# Patient Record
Sex: FEMALE | Race: WHITE | Employment: FULL TIME | URBAN - METROPOLITAN AREA
[De-identification: names, ages, dates, MRNs, and addresses within clinical notes are randomized per-mention and may not be internally consistent; named-entity substitution may affect disease eponyms.]

---

## 2024-05-13 NOTE — PROGRESS NOTES
[facility] NEUROLOGY  [address], [address]  [place] [address]  Phone: [phone] Fax [phone]  Jackson Null MD      Patient: Eva Trejo  Provider: Jackson Null MD    CC:   Chief Complaint   Patient presents with    New Patient     Parkinson's disease     Referring Provider:    History of Present Illness:     Eva Trejo is a 44 y.o. RH female who presents for evaluation of Parkinson's disease.     She is accompanied by her spouse.      Patient presents for further evaluation of a previous diagnosis of Parkinson's disease.  Symptom onset in approximately early 2022.  I have reviewed prior notes from other neurology groups.      Current medications include (but not limited to):  Ropinirole 0.5 mg at night  Rasagiline 0.5 mg daily    Previous medication trials include: N/A    Patient presents for further evaluation of probable Parkinson's disease.      Chart review indicates she was previously seen with [facility] in March 2023 with concern for tremors of the left upper extremity.  These were described as more of an intermittent jerking and myoclonic type movements, worsened by stress and other activities such as lifting.  Concern was raised for the possibility of a left ulnar nerve entrapment.  But I am not able to see other nerve conduction studies for review.    She had an MRI of the brain in October 2022 that was unremarkable.  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  She ultimately underwent a C3-C7 ACDF in July 2023.  She later had a DaTscan that was abnormal after worsening parkinsonism was suspected.    Unfortunately postoperatively she has only experienced worsening neck pain and stiffness.  She has very limited range of motion at the neck with head turning.  She has been working with physical therapy with only mild benefit.  She denies any tremor of the

## 2024-05-14 ENCOUNTER — OFFICE VISIT (OUTPATIENT)
Dept: NEUROLOGY | Age: 44
End: 2024-05-14
Payer: COMMERCIAL

## 2024-05-14 VITALS
HEIGHT: 68 IN | WEIGHT: 126 LBS | HEART RATE: 74 BPM | DIASTOLIC BLOOD PRESSURE: 91 MMHG | BODY MASS INDEX: 19.1 KG/M2 | SYSTOLIC BLOOD PRESSURE: 164 MMHG

## 2024-05-14 DIAGNOSIS — G89.29 CHRONIC NECK PAIN WITH ABNORMAL NEUROLOGIC EXAMINATION: ICD-10-CM

## 2024-05-14 DIAGNOSIS — G20.A1 PARKINSON'S DISEASE WITHOUT DYSKINESIA OR FLUCTUATING MANIFESTATIONS (HCC): Primary | ICD-10-CM

## 2024-05-14 DIAGNOSIS — G25.81 RLS (RESTLESS LEGS SYNDROME): ICD-10-CM

## 2024-05-14 DIAGNOSIS — G24.3 CERVICAL DYSTONIA: ICD-10-CM

## 2024-05-14 DIAGNOSIS — R26.9 GAIT DISTURBANCE: ICD-10-CM

## 2024-05-14 DIAGNOSIS — M54.2 CHRONIC NECK PAIN WITH ABNORMAL NEUROLOGIC EXAMINATION: ICD-10-CM

## 2024-05-14 PROCEDURE — 99205 OFFICE O/P NEW HI 60 MIN: CPT | Performed by: PSYCHIATRY & NEUROLOGY

## 2024-05-14 RX ORDER — BACLOFEN 10 MG/1
10 TABLET ORAL
COMMUNITY

## 2024-05-14 RX ORDER — TAPENTADOL HYDROCHLORIDE 50 MG/1
50 TABLET, FILM COATED ORAL 2 TIMES DAILY
COMMUNITY

## 2024-05-14 RX ORDER — ROPINIROLE 0.5 MG/1
0.5 TABLET, FILM COATED ORAL
COMMUNITY

## 2024-05-14 RX ORDER — RASAGILINE 0.5 MG/1
0.5 TABLET ORAL DAILY
COMMUNITY

## 2024-05-14 RX ORDER — ACETAMINOPHEN 160 MG
2000 TABLET,DISINTEGRATING ORAL DAILY
COMMUNITY

## 2024-05-14 RX ORDER — HYDROCHLOROTHIAZIDE 25 MG/1
25 TABLET ORAL DAILY
COMMUNITY

## 2024-05-14 ASSESSMENT — ENCOUNTER SYMPTOMS
VOICE CHANGE: 0
TROUBLE SWALLOWING: 1
COUGH: 0
CONSTIPATION: 1

## 2024-05-14 NOTE — PATIENT INSTRUCTIONS
Start carbidopa/levodopa  mg tablets.   Week 1: Take 1/2 tablet in the morning  Week 2: Take 1/2 tablet in the morning, 1/2 tablet in the afternoon  Week 3: Take 1 tablet in the morning, 1 tablet in the afternoon    Continue your other medications as prescribed.     Parkinson’s disease:    Parkinson’s disease is a progressive disease of the nervous system. In most patients, it is sporadic, which means it occurs in someone for no clear reason. Less than 10% of patients with Parkinson’s disease have a form that runs in the family. While there is currently no cure for Parkinson’s disease, there are now many different medications that can help with the symptoms of the disease. There is also a great deal of research on all aspects of Parkinson’s disease, including studying how to slow or stop its progression.  Parkinson’s disease usually starts by causing motor, or movement, symptoms. Some people will notice a tremor on one side of the body, usually in the hand when it is at rest, such as in one’s lap. Slowness and stiffness are also common symptoms in Parkinson’s disease, sometimes affecting a person’s ability to walk. Everyone with Parkinson’s disease is different, though, and one person may or may not have all the different symptoms.     People with Parkinson’s disease can also have non-motor, or non-movement, symptoms. For example, constipation is common. Some patients may also have urinary complaints. Anxiety and depression each occur at some point in over half of patients with Parkinson’s disease. Usually later in the disease course, difficulties with memory and thinking are common. Both movement and non-movement symptoms of Parkinson’s disease progress slowly over time. Many of these symptoms are treatable.     There are many different medications that can help the symptoms of Parkinson’s disease, and you can discuss with your doctor what options are best for you. Most of these medications target the

## 2024-05-31 ENCOUNTER — TELEPHONE (OUTPATIENT)
Dept: NEUROLOGY | Age: 44
End: 2024-05-31

## 2024-05-31 RX ORDER — CARBIDOPA 25 MG/1
TABLET ORAL
Qty: 90 TABLET | Refills: 1 | Status: SHIPPED | OUTPATIENT
Start: 2024-05-31

## 2024-05-31 NOTE — TELEPHONE ENCOUNTER
Patient taking Sinemet 25/100mg 1 tab BID. She gets extremely nauseous 2 hours after taking it. She has tried taking it with toast or a small meal but it doesn't help. Is there something she can take to counteract the nausea?

## 2024-06-03 NOTE — TELEPHONE ENCOUNTER
She can try the lodosyn as recommended by Dr. SHIELDS.     If it is not helpful or if she cannot get it from the pharmacy (sometimes out-of-pocket cost is high) then let us know and we can consider other alternatives.

## 2024-07-25 ENCOUNTER — OFFICE VISIT (OUTPATIENT)
Dept: NEUROLOGY | Age: 44
End: 2024-07-25
Payer: COMMERCIAL

## 2024-07-25 VITALS
DIASTOLIC BLOOD PRESSURE: 92 MMHG | HEART RATE: 73 BPM | SYSTOLIC BLOOD PRESSURE: 138 MMHG | OXYGEN SATURATION: 96 % | BODY MASS INDEX: 18.85 KG/M2 | WEIGHT: 124 LBS

## 2024-07-25 DIAGNOSIS — M54.2 NECK PAIN: ICD-10-CM

## 2024-07-25 DIAGNOSIS — R13.10 DYSPHAGIA, UNSPECIFIED TYPE: ICD-10-CM

## 2024-07-25 DIAGNOSIS — R20.0 NUMBNESS AND TINGLING: ICD-10-CM

## 2024-07-25 DIAGNOSIS — G20.A2 PARKINSON'S DISEASE WITHOUT DYSKINESIA, WITH FLUCTUATING MANIFESTATIONS (HCC): Primary | ICD-10-CM

## 2024-07-25 DIAGNOSIS — R49.8 HYPOPHONIA: ICD-10-CM

## 2024-07-25 DIAGNOSIS — R20.2 NUMBNESS AND TINGLING: ICD-10-CM

## 2024-07-25 DIAGNOSIS — R26.9 GAIT DISTURBANCE: ICD-10-CM

## 2024-07-25 DIAGNOSIS — M50.90 CERVICAL DISC DISORDER: ICD-10-CM

## 2024-07-25 PROBLEM — Z48.89 POSTOPERATIVE VISIT: Status: ACTIVE | Noted: 2023-11-13

## 2024-07-25 PROBLEM — G56.22 ENTRAPMENT OF LEFT ULNAR NERVE AT ELBOW: Status: ACTIVE | Noted: 2023-02-16

## 2024-07-25 PROBLEM — I10 ESSENTIAL HYPERTENSION: Status: ACTIVE | Noted: 2017-08-31

## 2024-07-25 PROBLEM — G20.A1 PARKINSON'S DISEASE (HCC): Status: ACTIVE | Noted: 2024-06-12

## 2024-07-25 PROBLEM — K21.9 GERD (GASTROESOPHAGEAL REFLUX DISEASE): Status: ACTIVE | Noted: 2024-07-25

## 2024-07-25 PROCEDURE — 99215 OFFICE O/P EST HI 40 MIN: CPT

## 2024-07-25 PROCEDURE — 3080F DIAST BP >= 90 MM HG: CPT

## 2024-07-25 PROCEDURE — 3075F SYST BP GE 130 - 139MM HG: CPT

## 2024-07-25 PROCEDURE — 99417 PROLNG OP E/M EACH 15 MIN: CPT

## 2024-07-25 RX ORDER — CARBIDOPA AND LEVODOPA 50; 200 MG/1; MG/1
1 TABLET, EXTENDED RELEASE ORAL NIGHTLY
Qty: 90 TABLET | Refills: 0 | Status: SHIPPED | OUTPATIENT
Start: 2024-07-25 | End: 2024-10-23

## 2024-07-25 NOTE — PROGRESS NOTES
Therapy      Today, the patient presents for ongoing follow-up and continued management of Parkinson's disease. This diagnosis is supported by an abnormal DaTscan.     Parkinson's disease:  -Continue rasagiline 0.5 mg daily  -Sinemet  mg 1 tablet 3 times a day  -Trial of Sinemet CR  mg 1 tablet at night    Cervical dystonia:  -Continue Botox injections as scheduled    Chronic neck pain:  -Continue to follow-up with pain management    Gait disturbance:  -Reviewed and discussed general safety precautions and fall prevention strategies    RLS:  -Continue ropinirole      -Counseled patient on the importance of consistency pertaining to the following: daily routine, sleep hygiene, adequate hydration, minimal caffeine intake, physical activity/aerobic exercise, healthy coping tools/stress management, and adherence to medication regimen.   -All questions were answered to the best of my ability.  -To follow up with Dr. Null as scheduled.         Return Schedule PT and ST evals with Stevie/Angelica. Coordinate visits..     Signature: ALEXIS Mcgowan     [facility]   [address] [place] [address], [place] [address]  Ph: [phone]  Fax: [phone]       I have personally interviewed and examined Eva Trejo is a 44 y.o. female and I have personally reviewed all relevant records including labs and imaging as noted above. I have written all aspects of this note. More than 50% of this time was used for counseling regarding my diagnosis, prognosis, and plans for management. Total visit time: 95 minutes.

## 2024-07-25 NOTE — PATIENT INSTRUCTIONS
Sinemet  mg 1 tablet three times daily (9 AM, 2 PM, 7 PM)  Sinemet CR  mg 1 tablet at night (1 hour before bed)

## 2024-08-27 ENCOUNTER — OFFICE VISIT (OUTPATIENT)
Age: 44
End: 2024-08-27
Payer: COMMERCIAL

## 2024-08-27 DIAGNOSIS — G20.A2 PARKINSON'S DISEASE WITHOUT DYSKINESIA, WITH FLUCTUATING MANIFESTATIONS (HCC): Primary | ICD-10-CM

## 2024-08-27 DIAGNOSIS — G20.A1 HYPOKINETIC PARKINSONIAN DYSPHONIA (HCC): ICD-10-CM

## 2024-08-27 DIAGNOSIS — R49.8 HYPOPHONIA: ICD-10-CM

## 2024-08-27 DIAGNOSIS — R29.3 ABNORMAL POSTURE: ICD-10-CM

## 2024-08-27 DIAGNOSIS — R26.81 UNSTEADINESS ON FEET: ICD-10-CM

## 2024-08-27 DIAGNOSIS — M54.2 NECK PAIN: ICD-10-CM

## 2024-08-27 DIAGNOSIS — M50.90 CERVICAL DISC DISORDER: ICD-10-CM

## 2024-08-27 DIAGNOSIS — R49.0 HYPOKINETIC PARKINSONIAN DYSPHONIA (HCC): ICD-10-CM

## 2024-08-27 DIAGNOSIS — R13.10 DYSPHAGIA, UNSPECIFIED TYPE: ICD-10-CM

## 2024-08-27 DIAGNOSIS — R26.9 GAIT ABNORMALITY: Primary | ICD-10-CM

## 2024-08-27 DIAGNOSIS — G20.A2 PARKINSON'S DISEASE WITHOUT DYSKINESIA, WITH FLUCTUATING MANIFESTATIONS (HCC): ICD-10-CM

## 2024-08-27 DIAGNOSIS — R26.9 GAIT DISTURBANCE: ICD-10-CM

## 2024-08-27 PROCEDURE — 97161 PT EVAL LOW COMPLEX 20 MIN: CPT

## 2024-08-27 PROCEDURE — 92524 BEHAVRAL QUALIT ANALYS VOICE: CPT | Performed by: SPEECH-LANGUAGE PATHOLOGIST

## 2024-08-27 NOTE — PROGRESS NOTES
Physical Therapy Initial Assessment     Referring MD: Sandra Roque, NP-C  Diagnosis:     ICD-10-CM    1. Gait abnormality  R26.9       2. Unsteadiness on feet  R26.81       3. Abnormal posture  R29.3       4. Neck pain  M54.2          Therapy precautions:None  Co-morbidities affecting plan of care: Parkinson's Disease, C3-C7 ACDF  Total Timed Procedure Codes: 0 min, Total Time: 45 min      CLINICAL DECISION MAKING/ASSESSMENT     Personal Factors/co-morbidities affecting POC (1-2 Medium/3+High): behavioral patterns  habits/routines  co-morbidities as previously noted   Problem List: (1-2 Low/ 3 Medium/ 4+ High) Pain  Soft tissue restrictions  Strength deficits  Muscle spasm  Motor control deficits  Impaired posture  Impaired gait  Impaired balance/proprioception    Clinical decision making: low complexity with therapist able to easily and confidently determine and predict expectations and future outcomes for the POC.  Prognosis: good   Benefits and precautions of treatment explained to patient.    Eva Trejo is a 44 y.o. female who presents to therapy today with a h/o Parkinson's disease and C3-C7 ACDF in 7/2023.  She was initially doing well following C-spine surgery but several weeks after surgery she began to lose ROM and started to experience increase pain in the neck.  She has been participating in PT with [facility] to address her neck issues with her most recent visit being 8/22/24.  She is being referred here for PT related to her Parkinson's Disease.  Her gait and balance deficits are very mild at this time however she is noticing some mild instability with performing her home exercises at time. A large portion of her visit today was spent discussing and assessing her neck deficits which are negatively impacting her mobility therefore not as much testing of balance and mobility could be completed.  She did however demonstrate some mild gait deficits, mostly being marked by decreased L arm-swing.  She

## 2024-08-27 NOTE — PROGRESS NOTES
moderate  [] 3 severe   Roughness(roughness/irregularity, breaks; not smooth)  [x] 1  [] 2  [] 3  BREATHINESS(audible escape of air during speech   [x] 1 mild   [] 2 moderate   [] 3 severe   AESTHENIA (loss of strength & energy; weakness)  [] 1 mild   [] 2 moderate   [] 3 severe   STRAINED (marked by excessive effort   [] 1 mild    [] 2 moderate   [] 3 severe   TOTAL GRBAS SCORE (0=NORMAL) 2  Throat Pain    [] Yes    [x] No   Speech:   [] Frequent Repetitions Swallowing Difficulty   [x]Yes [] No   []Solids:    []Liquids:   [x]Medication   Frequent Throat Clearing   [x]Yes [] No  Comments: endorses increased phlegm at night that will sometimes wake her up.    Cognitive Changes   []Yes [x] No  MOCA []Yes [x] No  Comments:    [] Rapid / Slow Speech       [] Mumbling   []Imprecise articulation  [] Dysfluency  []Stuttering  []Reduced Breath Support for Speech                                          SUBJECTIVE ASSESSMENT:    [] Profound      [] Severe                                                          []  Moderate     [x] Mild     []  WFL IMPROVED DURING STIMULABILITY TEST?       Vocal Intensity  []1     []2     [] 3   [x]4   []5                            [x]Yes       [] No   Articulation  []1     []2     [] 3   [x]4   [] 5             [x] Yes      []  No   Prosody  []1     []2     [] 3   [x]4   [] 5            [x]Yes       []  No   Vocal Quality  Breathy / Hoarse / Glottal Guevara  []1     []2     [] 3   [x]4   [] 5            [x] Yes      []  No   Breath Support for Speech []1     []2     [] 3   [x]4    [] 5            [x]Yes       []No   Fluency & Rate of Speech  Rapid / Slow / Dysfluent []1     []2     [] 3   [x]4   [] 5            [x]Yes       []No   Comments:       OBJECTIVE ASSESSMENT:                    Ah:   18 seconds         60 db          Paragraph 72 db                    Conversation 71 db              Ah with intent: 18 seconds 74 dB     RECOMMENDATIONS:   [x]SPEAK OUT! Therapy: 8-10  sessions for 12  weeks                              Additional REFERRALS    []Modified barium swallow study    []ENT     Patient Education  [x] New Education Provided:   [] Reviewed prior education  Learner: [x] Patient       [] Spouse      []  Family       []  Other  Method of Education:    [x] Discussion [] Demonstration       [] Handout  Evaluation of Education:  [x] Verbalized Understanding     [] Demonstrates without assistance       [] Demonstrates with assistance  []  Needs further instruction        [] No evidence of learning                        ___________________________________________________________________________________   LONG TERM GOAL:  Use intentional speech to maximize functional communication in all settings.  SHORT TERM GOALS:   Use INTENT to coordinate respiratory/phonatory/articulatory subsystems in hierarchical speech tasks with min cues.  Demonstrate use of intent at phrase, sentence, paragraph, and/or conversational levels with min cues.  Establish a daily home practice routine independently/with appropriate support.        Angelica Taylor M.Ed CCC-SLP  TIME 45 minutes

## 2024-09-05 ENCOUNTER — OFFICE VISIT (OUTPATIENT)
Age: 44
End: 2024-09-05

## 2024-09-05 DIAGNOSIS — G20.A1 HYPOKINETIC PARKINSONIAN DYSPHONIA (HCC): ICD-10-CM

## 2024-09-05 DIAGNOSIS — M54.2 NECK PAIN: ICD-10-CM

## 2024-09-05 DIAGNOSIS — R26.9 GAIT ABNORMALITY: Primary | ICD-10-CM

## 2024-09-05 DIAGNOSIS — R26.81 UNSTEADINESS ON FEET: ICD-10-CM

## 2024-09-05 DIAGNOSIS — R29.3 ABNORMAL POSTURE: ICD-10-CM

## 2024-09-05 DIAGNOSIS — R49.0 HYPOKINETIC PARKINSONIAN DYSPHONIA (HCC): ICD-10-CM

## 2024-09-05 DIAGNOSIS — G20.A2 PARKINSON'S DISEASE WITHOUT DYSKINESIA, WITH FLUCTUATING MANIFESTATIONS (HCC): Primary | ICD-10-CM

## 2024-09-05 NOTE — PROGRESS NOTES
Physical Therapy Daily Note     Referring MD: Sandra Roque, NP-C  Diagnosis:     ICD-10-CM    1. Gait abnormality  R26.9       2. Unsteadiness on feet  R26.81       3. Abnormal posture  R29.3       4. Neck pain  M54.2          Therapy precautions:None  Co-morbidities affecting plan of care: Parkinson's Disease, C3-C7 ACDF   Start Time: 1:50 PM   Stop Time: 2:30 PM  Total Timed Procedure Codes: 40 min, Total Time: 40 min  Visit Count: 2                                          Visits since last evaluative note: 1  POC Expiration: 10/28/2024    ASSESSMENT:   [] Progressing toward goals.  [] No change.  [x] Other: Able to perform additional gait and dynamic balance testing today with pt doing very well, in fact he she scored a 29/30 on the FGA.  She is no longer participating in PT at [facility] for her neck therefore HEP for stretching was reviewed and updated today to address neck discomfort.  She responded well to stretches with some increase in head/neck symptoms with levator scap stretching therefore she was instructed to not perform this at home if it continues to increase her sx.  She was also instructed to stop strengthening exercises at this time and only focus on stretching(see below for details) until her next visit to see if it makes a significant improvement.  Her greatest areas of neck discomfort at the R mastoid process and suboccipital region but then progresses throughout the region.     PLAN:   PLAN FOR FUTURE VISITS:   [] Continue current frequency toward long and short term goals.    [x] Specific Instructions for subsequent treatments: Continue PT to provide balance, agility and multi-tasking activities for HEP.    SUBJECTIVE:     Pt reporting 6-7/10 neck and head pain.  Denies any falls since her last visit.  Says she feels her balance is about the same that it always has been, even before being diagnosed with Parkinson's Disease.     OBJECTIVE:   GAIT TRAINING(26757): x 15 minutes to

## 2024-09-05 NOTE — PROGRESS NOTES
VOICE DAILY NOTE          Eva Trejo  1980  [id number]  ICD-10: Treatment Diagnosis: R49.0 Dysphonia; Hoarseness  Precautions/Allergies:   No Known Allergies  TREATMENT PLAN:  Effective Dates: 8/27/2024 TO 11/27/2024.  Frequency/Duration: once a week for 90 Day(s)  REFERRING PHYSICIAN: Sandra Roque NP-C      TREATMENT DATE: 09/05/24    PROBLEM LIST (Impairments causing functional limitations):  Dysphonia   INTERVENTIONS PLANNED: (Treatment may consist of any combination of the following)    [x] Speech/Lang Therapy  03151 [] Dysphagia Therapy  95249   [] TherIvntj Cog Funcj Contact (1st 15 min)  24466 [] TherIvntj Cog Funcj Contact (addt'l 15 min)  44678         Session #: 1  Length of session (lesson 7 9/11): 45 minutes   1 2 3 4 5 6 7 8 9 10 Cueing Results   Produce spontaneous  conversation at 85-90 dB. 65 66 66 58 56 64 66 68 65 69 No cueing 0/10                  Warm up voice at 85-90 dB. 77 87 86 80 80 83 80 87 83 85 [] 0      [] min  [] mod [x] max 4/10                  Sustain “ah” for   10 seconds. 10 10 10 10 10 10 10 10 10 10 [x] 0      [] min  [] mod [] max 10/10   Sustain “ah” at 85-90 dB. 85 84 85 83 85 83 85 83 87 83 [] 0      [] min  [x] mod [] max 5/10                  Produce vocal glides    at 85-90 dB. (Lowest dB level) 85 86 86 85 88 85 86 91 88 85 [x] 0     [] min  [] mod [] max 10/10   Produce vocal glides with good,   clear vocal quality (+/-). + + + + + + + + + + [x] 0      [] min  [] mod [] max 10/10                  Recite numerical sequences    At 80-85 dB. 80 75 83 83 76 84 80 79 78 68 [] 0     [] min  [x] mod [] max 5/10                  Read phrases / sentences /  paragraphs  at 75-85 dB. 77 74 79 82 75 82 83 86 84 82

## 2024-09-11 ENCOUNTER — OFFICE VISIT (OUTPATIENT)
Age: 44
End: 2024-09-11

## 2024-09-11 ENCOUNTER — OFFICE VISIT (OUTPATIENT)
Age: 44
End: 2024-09-11
Payer: COMMERCIAL

## 2024-09-11 DIAGNOSIS — G20.A1 HYPOKINETIC PARKINSONIAN DYSPHONIA (HCC): ICD-10-CM

## 2024-09-11 DIAGNOSIS — R29.3 ABNORMAL POSTURE: ICD-10-CM

## 2024-09-11 DIAGNOSIS — R49.0 HYPOKINETIC PARKINSONIAN DYSPHONIA (HCC): ICD-10-CM

## 2024-09-11 DIAGNOSIS — R26.9 GAIT ABNORMALITY: Primary | ICD-10-CM

## 2024-09-11 DIAGNOSIS — G20.A2 PARKINSON'S DISEASE WITHOUT DYSKINESIA, WITH FLUCTUATING MANIFESTATIONS (HCC): Primary | ICD-10-CM

## 2024-09-11 DIAGNOSIS — M54.2 NECK PAIN: ICD-10-CM

## 2024-09-11 DIAGNOSIS — R26.81 UNSTEADINESS ON FEET: ICD-10-CM

## 2024-09-11 PROCEDURE — 97116 GAIT TRAINING THERAPY: CPT

## 2024-09-11 PROCEDURE — 97112 NEUROMUSCULAR REEDUCATION: CPT

## 2024-09-20 ENCOUNTER — OFFICE VISIT (OUTPATIENT)
Age: 44
End: 2024-09-20

## 2024-09-20 ENCOUNTER — OFFICE VISIT (OUTPATIENT)
Age: 44
End: 2024-09-20
Payer: COMMERCIAL

## 2024-09-20 DIAGNOSIS — R29.3 ABNORMAL POSTURE: ICD-10-CM

## 2024-09-20 DIAGNOSIS — R26.9 GAIT ABNORMALITY: Primary | ICD-10-CM

## 2024-09-20 DIAGNOSIS — G20.A2 PARKINSON'S DISEASE WITHOUT DYSKINESIA, WITH FLUCTUATING MANIFESTATIONS (HCC): Primary | ICD-10-CM

## 2024-09-20 DIAGNOSIS — R49.0 HYPOKINETIC PARKINSONIAN DYSPHONIA (HCC): ICD-10-CM

## 2024-09-20 DIAGNOSIS — M54.2 NECK PAIN: ICD-10-CM

## 2024-09-20 DIAGNOSIS — R26.81 UNSTEADINESS ON FEET: ICD-10-CM

## 2024-09-20 DIAGNOSIS — G20.A1 HYPOKINETIC PARKINSONIAN DYSPHONIA (HCC): ICD-10-CM

## 2024-09-20 PROCEDURE — 97116 GAIT TRAINING THERAPY: CPT

## 2024-09-20 PROCEDURE — 97112 NEUROMUSCULAR REEDUCATION: CPT

## 2024-09-24 ENCOUNTER — OFFICE VISIT (OUTPATIENT)
Dept: NEUROLOGY | Age: 44
End: 2024-09-24
Payer: COMMERCIAL

## 2024-09-24 VITALS
HEART RATE: 80 BPM | SYSTOLIC BLOOD PRESSURE: 149 MMHG | DIASTOLIC BLOOD PRESSURE: 87 MMHG | BODY MASS INDEX: 18.85 KG/M2 | HEIGHT: 68 IN

## 2024-09-24 DIAGNOSIS — G24.3 CERVICAL DYSTONIA: ICD-10-CM

## 2024-09-24 DIAGNOSIS — G25.81 RLS (RESTLESS LEGS SYNDROME): ICD-10-CM

## 2024-09-24 DIAGNOSIS — R49.8 HYPOPHONIA: ICD-10-CM

## 2024-09-24 DIAGNOSIS — R26.9 GAIT DISTURBANCE: ICD-10-CM

## 2024-09-24 DIAGNOSIS — G20.A2 PARKINSON'S DISEASE WITHOUT DYSKINESIA, WITH FLUCTUATING MANIFESTATIONS (HCC): Primary | ICD-10-CM

## 2024-09-24 PROCEDURE — 3079F DIAST BP 80-89 MM HG: CPT | Performed by: PSYCHIATRY & NEUROLOGY

## 2024-09-24 PROCEDURE — 99214 OFFICE O/P EST MOD 30 MIN: CPT | Performed by: PSYCHIATRY & NEUROLOGY

## 2024-09-24 PROCEDURE — 3077F SYST BP >= 140 MM HG: CPT | Performed by: PSYCHIATRY & NEUROLOGY

## 2024-09-24 RX ORDER — BACLOFEN 10 MG/1
40 TABLET ORAL 3 TIMES DAILY
Qty: 360 TABLET | Refills: 5 | Status: SHIPPED | OUTPATIENT
Start: 2024-09-24 | End: 2025-03-23

## 2024-09-24 RX ORDER — GABAPENTIN 300 MG/1
300 CAPSULE ORAL
COMMUNITY

## 2024-09-24 RX ORDER — TRIHEXYPHENIDYL HYDROCHLORIDE 2 MG/1
2 TABLET ORAL 3 TIMES DAILY
COMMUNITY

## 2024-09-24 RX ORDER — ROPINIROLE 0.5 MG/1
0.5 TABLET, FILM COATED ORAL
Qty: 90 TABLET | Refills: 3 | Status: SHIPPED | OUTPATIENT
Start: 2024-09-24

## 2024-09-24 ASSESSMENT — ENCOUNTER SYMPTOMS
VOICE CHANGE: 0
CONSTIPATION: 1
TROUBLE SWALLOWING: 1
COUGH: 0

## 2024-09-25 ENCOUNTER — OFFICE VISIT (OUTPATIENT)
Age: 44
End: 2024-09-25
Payer: COMMERCIAL

## 2024-09-25 DIAGNOSIS — R26.9 GAIT ABNORMALITY: Primary | ICD-10-CM

## 2024-09-25 DIAGNOSIS — G20.A2 PARKINSON'S DISEASE WITHOUT DYSKINESIA, WITH FLUCTUATING MANIFESTATIONS (HCC): Primary | ICD-10-CM

## 2024-09-25 DIAGNOSIS — G20.A1 HYPOKINETIC PARKINSONIAN DYSPHONIA (HCC): ICD-10-CM

## 2024-09-25 DIAGNOSIS — R49.0 HYPOKINETIC PARKINSONIAN DYSPHONIA (HCC): ICD-10-CM

## 2024-09-25 DIAGNOSIS — R29.3 ABNORMAL POSTURE: ICD-10-CM

## 2024-09-25 DIAGNOSIS — R26.81 UNSTEADINESS ON FEET: ICD-10-CM

## 2024-09-25 DIAGNOSIS — M54.2 NECK PAIN: ICD-10-CM

## 2024-09-25 PROCEDURE — 97112 NEUROMUSCULAR REEDUCATION: CPT

## 2024-09-25 PROCEDURE — 92507 TX SP LANG VOICE COMM INDIV: CPT | Performed by: SPEECH-LANGUAGE PATHOLOGIST

## 2024-09-25 PROCEDURE — 97116 GAIT TRAINING THERAPY: CPT

## 2024-09-25 NOTE — PROGRESS NOTES
support.  Side-stepping at 1.0 x 1:30 each direction with UUE support, supervision and VC to increase step-length.  Backward stepping at 1.2 mph x 2:00 with BUE support, supervision.   degree turns with BUE support, SBA and VC for maintaining increased step-length and fluidity of turns, 0.8 mph x 3:00.     NEUROMUSCULAR RE-EDUCATION (46873) x 31 minutes to address impaired balance, coordination, kinesthetic sense, posture and proprioception.  Lateral stepping along length of raised 8 ft plinth while reaching to Blaze Pods to improve lateral stability:  2 X 1:30 with 6 Blaze Pods, CGA with VC to slow benson and maintain increased step-length.   Repeated with \"distractor\" Blaze Pods and pt having to contact only targeted color, 2 x 1:30, SBA.  Repeated with forward/backward, sideways and diagonal stepping to 3 Blaze pods to each side  by ~8 ft, 2 x 1:30, CGA and VC as above.  Repeated with \"distractor\" Blaze Pods and pt having to contact only targeted color, 2 x 1:30, SBA.    Overground ambulation in confined space(~15 ft diameter) with 6 Blaze Pods randomly placed on floor and pt performing toe tap to target when it illuminates, VC provided to slow benson and increased step-height, 3 x 1:30.  Repeated with \"distractor\" Blaze Pods and pt having to contact only targeted color, 2 x 1:30, SBA.  Repeated once again with obstacles in the way to step over and around, 2 x 1:30, SBA.    \"Around the [place]\" activity with Blaze pods and with BOSU in center, 2 x 1:30 each direction, Zunilda and VC for increased size and slower speed of weight shifting onto BOSU to achieve greater stability.  Repeated with \"distractor\" Blaze Pods and pt having to contact only targeted color, 2 x 1:30, SBA.

## 2024-10-03 ENCOUNTER — OFFICE VISIT (OUTPATIENT)
Age: 44
End: 2024-10-03

## 2024-10-03 DIAGNOSIS — G20.A1 HYPOKINETIC PARKINSONIAN DYSPHONIA (HCC): ICD-10-CM

## 2024-10-03 DIAGNOSIS — R49.0 HYPOKINETIC PARKINSONIAN DYSPHONIA (HCC): ICD-10-CM

## 2024-10-03 DIAGNOSIS — G20.A2 PARKINSON'S DISEASE WITHOUT DYSKINESIA, WITH FLUCTUATING MANIFESTATIONS (HCC): Primary | ICD-10-CM

## 2024-10-03 NOTE — PROGRESS NOTES
and/or conversational levels with min cues. MET 10/3/24  Establish a daily home practice routine independently/with appropriate support. . MET 10/3/24    LONG TERM GOAL:  Use intentional speech to maximize functional communication in all settings. MET 10/3/24    PLAN:     [x]  Patient / family was informed of the above progress and discharge recommendations.           Angelica Taylor M.Ed CCC-SLP

## 2024-10-10 ENCOUNTER — OFFICE VISIT (OUTPATIENT)
Dept: NEUROLOGY | Age: 44
End: 2024-10-10

## 2024-10-10 DIAGNOSIS — G25.81 RLS (RESTLESS LEGS SYNDROME): ICD-10-CM

## 2024-10-10 DIAGNOSIS — G20.A2 PARKINSON'S DISEASE WITHOUT DYSKINESIA, WITH FLUCTUATING MANIFESTATIONS (HCC): Primary | ICD-10-CM

## 2024-10-10 DIAGNOSIS — R26.9 GAIT DISTURBANCE: ICD-10-CM

## 2024-10-10 DIAGNOSIS — G24.3 CERVICAL DYSTONIA: ICD-10-CM

## 2024-10-10 DIAGNOSIS — R49.8 HYPOPHONIA: ICD-10-CM

## 2024-10-10 ASSESSMENT — ENCOUNTER SYMPTOMS
TROUBLE SWALLOWING: 1
CONSTIPATION: 1
VOICE CHANGE: 0
COUGH: 0

## 2024-10-10 NOTE — PROGRESS NOTES
expression.  No facial asymmetry. Tongue and palate were midline.  Neck motor exam noted below.  Shoulder shrug delayed on the left as compared to the right.    Motor: Strength was full in all proximal and distal muscle groups.  There is mild rigidity of the left upper extremity.  Tone in the right upper extremity is normal.  Tone in the lower extremities is normal.  There is dramatically reduced range of motion at the neck in both the horizontal and vertical plane.  There is a rightward lateral shift noted at rest and a tendency for leftward head tilt.  No head tremor is noted.    Abnormal Movements: There was no tremor or hyperkinetic movements.     Sensory: Normal to light touch throughout.    Cerebellar: No ataxia or dysmetria with finger-nose-finger bilaterally.      Reflexes (R/L): Biceps (2+/2+), Brachioradialis (2+/2+), Patellar (2+/2+). Tang's was negative and plantar responses were flexor.      Gait: She can rise from a seated position without difficulty.  Posture is upright.  She walks with a relatively fluid gait though there is loss of activation of the left lower extremity and there is absent arm swing on the left.  Turns are fluid.  There is no start hesitation or festination noted.  Postural reflexes are intact.      Assessment and Plan:     Eva Trejo is a 44 y.o. female who presents with the following issues:     Eva was seen today for procedure and follow-up.    Diagnoses and all orders for this visit:    Parkinson's disease without dyskinesia, with fluctuating manifestations (HCC)    Cervical dystonia  -     Onabotulinumtoxin A (BOTOX) injection 200 Units  -     99013 - Chemodenervation of neck muscle(s) (BILATERAL)  -     67485 - EMG, chemodenervation    Gait disturbance [R26.9]    Hypophonia [R49.8]    RLS (restless legs syndrome)      Patient presents for follow-up and continued management of Parkinson's disease, supported by an abnormal DaTscan, complicated by cervical dystonia

## 2024-10-17 ENCOUNTER — OFFICE VISIT (OUTPATIENT)
Age: 44
End: 2024-10-17

## 2024-10-17 DIAGNOSIS — M54.2 NECK PAIN: ICD-10-CM

## 2024-10-17 DIAGNOSIS — R26.81 UNSTEADINESS ON FEET: ICD-10-CM

## 2024-10-17 DIAGNOSIS — R26.9 GAIT ABNORMALITY: Primary | ICD-10-CM

## 2024-10-17 DIAGNOSIS — R29.3 ABNORMAL POSTURE: ICD-10-CM

## 2024-10-17 NOTE — PROGRESS NOTES
Physical Therapy Progress Report and Daily Note     Referring MD: Sandra Roque, NP-C  Diagnosis:     ICD-10-CM    1. Gait abnormality  R26.9       2. Unsteadiness on feet  R26.81       3. Abnormal posture  R29.3       4. Neck pain  M54.2         Therapy precautions:None  Co-morbidities affecting plan of care: Parkinson's Disease, C3-C7 ACDF   Start Time: 8:16 AM   Stop Time: 9:00 AM  Total Timed Procedure Codes: 44 min, Total Time: 44 min  Visit Count: 6                                          Visits since last evaluative note: 5  POC Expiration: 10/28/2024    ASSESSMENT:   [] Progressing toward goals.  [] No change.  [x] Other: Eva has been seen for 5 visits since her initial evaluation to establish HEP based on ACSM / Parkinson's [employer] guidelines.  She has tolerated all interventions well and overall is doing much better, even with regards to her next as pain levels are down to 2/10, she is able to work longer days, and is sleeping better but this is mostly a result of neurology medication changes and changes to botox injection patterns.  However there have also been significant changes made to her neck strengthening/stretching HEP which has had a positive impact on her neck as well.  Though she is almost ready to be discharge from PT, we will plan for 1 more f/u in about a month to ensure continue improvements in her neck ROM and comfort prior to discharge.     PLAN:     GOALS    Long-term Goals: (8 weeks)    Pt will report compliance with HEP at least 4 days per week to maximize benefits of participation in skilled outpatient physical therapy. (MET)    PLAN FOR FUTURE VISITS:   [] Continue current frequency toward long and short term goals.    [x] Specific Instructions for subsequent treatments: Continue PT to progress balance, agility and multi-tasking activities for HEP and address ongoing neck pain.    SUBJECTIVE:     Pt reporting improve jaw discomfort and some improvement in neck

## 2024-10-19 DIAGNOSIS — G20.A2 PARKINSON'S DISEASE WITHOUT DYSKINESIA, WITH FLUCTUATING MANIFESTATIONS (HCC): ICD-10-CM

## 2024-10-21 DIAGNOSIS — G20.A2 PARKINSON'S DISEASE WITHOUT DYSKINESIA, WITH FLUCTUATING MANIFESTATIONS (HCC): ICD-10-CM

## 2024-10-21 RX ORDER — CARBIDOPA AND LEVODOPA 50; 200 MG/1; MG/1
1 TABLET, EXTENDED RELEASE ORAL NIGHTLY
Qty: 90 TABLET | Refills: 3 | Status: SHIPPED | OUTPATIENT
Start: 2024-10-21

## 2024-10-22 RX ORDER — CARBIDOPA AND LEVODOPA 25; 100 MG/1; MG/1
1 TABLET ORAL 3 TIMES DAILY
Qty: 270 TABLET | Refills: 3 | Status: SHIPPED | OUTPATIENT
Start: 2024-10-22 | End: 2025-10-17

## 2024-11-03 ENCOUNTER — PATIENT MESSAGE (OUTPATIENT)
Dept: NEUROLOGY | Age: 44
End: 2024-11-03

## 2024-11-19 ENCOUNTER — OFFICE VISIT (OUTPATIENT)
Age: 44
End: 2024-11-19

## 2024-11-19 DIAGNOSIS — R26.81 UNSTEADINESS ON FEET: ICD-10-CM

## 2024-11-19 DIAGNOSIS — M54.2 NECK PAIN: ICD-10-CM

## 2024-11-19 DIAGNOSIS — R26.9 GAIT ABNORMALITY: Primary | ICD-10-CM

## 2024-11-19 DIAGNOSIS — R29.3 ABNORMAL POSTURE: ICD-10-CM

## 2024-11-29 NOTE — PROGRESS NOTES
Physical Therapy Discharge     Referring MD: Sandra Roque, NP-C  Diagnosis:     ICD-10-CM    1. Gait abnormality  R26.9       2. Unsteadiness on feet  R26.81       3. Abnormal posture  R29.3       4. Neck pain  M54.2           Therapy precautions:None  Co-morbidities affecting plan of care: Parkinson's Disease, C3-C7 ACDF   Start Time: 2:30 PM  Stop Time: 3:05 PM  Total Timed Procedure Codes: 20 min, Total Time: 35 min  Visit Count: 7                                          Visits since last evaluative note: 6  POC Expiration: 10/28/2024    ASSESSMENT:   [] Progressing toward goals.  [] No change.  [x] Other: Overall from a neck ROM and function standpoint, Mrs. Trejo is much improved from her initial evaluation.  She continues to get some discomfort throughout the day and she is working with her neurologist to determine the best medication/botox regiment to address this.  She continues to exercise regularly and is beginning to determine the exercises that she enjoys the most.  Her HEP was reviewed today from a comprehensive standpoint but also the details of stretching throughout the day to help manage dystonic positioning and pain in the neck.  Overall, Mrs. Trejo has achieved her maximal benefit from PT at this time and is appropriate for discharge from PT.  Given the progressive nature of her condition, a 6 month f/u with PT to re-assess her neck/posture, walking, balance and functional mobility is medically necessary to ensure continued safety and determine if there is a need for adjustment to her HEP to slow the physical decline associated with her neurodegenerative condition.     PLAN:     GOALS    Long-term Goals: (8 weeks)    Pt will report compliance with HEP at least 4 days per week to maximize benefits of participation in skilled outpatient physical therapy. (MET)    PLAN FOR FUTURE VISITS:   [] Continue current frequency toward long and short term goals.    [x] Specific Instructions for

## 2025-01-09 ENCOUNTER — OFFICE VISIT (OUTPATIENT)
Age: 45
End: 2025-01-09

## 2025-01-09 DIAGNOSIS — R49.0 HYPOKINETIC PARKINSONIAN DYSPHONIA (HCC): ICD-10-CM

## 2025-01-09 DIAGNOSIS — G20.A1 HYPOKINETIC PARKINSONIAN DYSPHONIA (HCC): ICD-10-CM

## 2025-01-09 DIAGNOSIS — G20.A2 PARKINSON'S DISEASE WITHOUT DYSKINESIA, WITH FLUCTUATING MANIFESTATIONS (HCC): Primary | ICD-10-CM

## 2025-01-09 NOTE — PROGRESS NOTES
VOICE EVALUATION                                                                                           Initial Assessment    Eva Trejo  1980  [id number]  ICD-10: Treatment Diagnosis: R49.0 Dysphonia; Hoarseness  Precautions/Allergies:   No Known Allergies  TREATMENT PLAN:  Effective Dates: 1/9/2025 TO 4/30/2025.  Frequency/Duration: once a week for 90 Day(s) MEDICAL/REFERRING DIAGNOSIS:    ICD-10-CM    1. Parkinson's disease without dyskinesia, with fluctuating manifestations (HCC)  G20.A2       2. Hypokinetic Parkinsonian dysphonia (HCC)  G20.A1     R49.0          DATE OF ONSET: October 2024  REFERRING PHYSICIAN: Jackson Null MD MD Orders: evaluate and treat   Current Outpatient Medications on File Prior to Visit   Medication Sig Dispense Refill    carbidopa-levodopa (SINEMET)  MG per tablet Take 1 tablet by mouth 3 times daily 270 tablet 3    carbidopa-levodopa (SINEMET CR)  MG per extended release tablet TAKE 1 TABLET BY MOUTH EVERY DAY AT NIGHT 90 tablet 3    gabapentin (NEURONTIN) 300 MG capsule Take 1 capsule by mouth nightly.      trihexyphenidyl (ARTANE) 2 MG tablet Take 1 tablet by mouth 3 times daily      rOPINIRole (REQUIP) 0.5 MG tablet Take 1 tablet by mouth nightly 90 tablet 3    baclofen (LIORESAL) 10 MG tablet Take 4 tablets by mouth 3 times daily 360 tablet 5    Onabotulinumtoxin A (BOTOX) 200 units injection Take 200 units by injection route.      onabotulinumtoxinA (BOTOX) 100 units injection Take 100 units by injection route.      rasagiline (AZILECT) 0.5 MG TABS Take 1 tablet by mouth daily      hydroCHLOROthiazide (HYDRODIURIL) 25 MG tablet Take 1 tablet by mouth daily      Cholecalciferol (VITAMIN D3) 50 MCG (2000 UT) CAPS Take 1 capsule by mouth daily      tapentadol (NUCYNTA) 50 MG TABS Take 1.5 tablets by mouth in the morning and at bedtime.       No current

## 2025-01-16 ENCOUNTER — OFFICE VISIT (OUTPATIENT)
Dept: NEUROLOGY | Age: 45
End: 2025-01-16

## 2025-01-16 VITALS
BODY MASS INDEX: 18.85 KG/M2 | SYSTOLIC BLOOD PRESSURE: 133 MMHG | DIASTOLIC BLOOD PRESSURE: 87 MMHG | HEART RATE: 78 BPM | HEIGHT: 68 IN

## 2025-01-16 DIAGNOSIS — R49.8 HYPOPHONIA: ICD-10-CM

## 2025-01-16 DIAGNOSIS — G20.A2 PARKINSON'S DISEASE WITHOUT DYSKINESIA, WITH FLUCTUATING MANIFESTATIONS (HCC): Primary | ICD-10-CM

## 2025-01-16 DIAGNOSIS — G24.3 CERVICAL DYSTONIA: ICD-10-CM

## 2025-01-16 DIAGNOSIS — G25.81 RLS (RESTLESS LEGS SYNDROME): ICD-10-CM

## 2025-01-16 DIAGNOSIS — R26.9 GAIT DISTURBANCE: ICD-10-CM

## 2025-01-16 ASSESSMENT — ENCOUNTER SYMPTOMS
TROUBLE SWALLOWING: 1
CONSTIPATION: 1
COUGH: 0
VOICE CHANGE: 0

## 2025-01-16 NOTE — PROGRESS NOTES
are superimposed acquired disc herniations as described above producing more pronounced moderate to severe central canal stenosis at C3-4, C4-5, and C5-6 with borderline central canal stenosis at C2-3, and moderate central canal stenosis at C6-7. No abnormal cord signal changes are currently seen. Additional mild right neural foraminal stenoses are seen at C3-4 and C4-5 from uncovertebral joint hypertrophic degenerative change at these levels.   2. Diffuse low T1 signal without suspicious associated STIR signal changes. This can be seen with a variety of benign etiologies in this patient demographic. However, an aggressive marrow replacing process can have a similar appearance although is felt to be less likely. This can be correlated with clinical exam findings and clinical labs to exclude clinical findings which could suggest an aggressive bony process, however.       Past Medical History:     Past medical history, surgical history, social history, family history, medications, and allergies were reviewed and updated as appropriate.     PAST MEDICAL HISTORY:  Past Medical History:   Diagnosis Date    Hypertension      PAST SURGICAL HISTORY:   Past Surgical History:   Procedure Laterality Date    CERVICAL FUSION      c3-c7    HEMORRHOID SURGERY       FAMILY HISTORY:  Family History   Problem Relation Age of Onset    Lung Cancer Father       SOCIAL HISTORY:  Social History     Socioeconomic History    Marital status:      Spouse name: None    Number of children: None    Years of education: None    Highest education level: None   Tobacco Use    Smoking status: Never    Smokeless tobacco: Never   Substance and Sexual Activity    Alcohol use: Yes    Drug use: Never     Social Determinants of Health     Financial Resource Strain: Low Risk  (7/12/2023)    [facility]    Overall Financial Resource Strain (CARDIA)     Difficulty of

## 2025-02-19 ENCOUNTER — OFFICE VISIT (OUTPATIENT)
Dept: NEUROLOGY | Age: 45
End: 2025-02-19
Payer: COMMERCIAL

## 2025-02-19 VITALS
DIASTOLIC BLOOD PRESSURE: 85 MMHG | WEIGHT: 121 LBS | HEIGHT: 68 IN | BODY MASS INDEX: 18.34 KG/M2 | SYSTOLIC BLOOD PRESSURE: 126 MMHG | HEART RATE: 68 BPM

## 2025-02-19 DIAGNOSIS — G25.81 RLS (RESTLESS LEGS SYNDROME): ICD-10-CM

## 2025-02-19 DIAGNOSIS — R49.8 HYPOPHONIA: ICD-10-CM

## 2025-02-19 DIAGNOSIS — R26.9 GAIT DISTURBANCE: ICD-10-CM

## 2025-02-19 DIAGNOSIS — G20.A2 PARKINSON'S DISEASE WITHOUT DYSKINESIA, WITH FLUCTUATING MANIFESTATIONS (HCC): Primary | ICD-10-CM

## 2025-02-19 DIAGNOSIS — G24.3 CERVICAL DYSTONIA: ICD-10-CM

## 2025-02-19 PROCEDURE — 99214 OFFICE O/P EST MOD 30 MIN: CPT | Performed by: PSYCHIATRY & NEUROLOGY

## 2025-02-19 PROCEDURE — 3079F DIAST BP 80-89 MM HG: CPT | Performed by: PSYCHIATRY & NEUROLOGY

## 2025-02-19 PROCEDURE — 3074F SYST BP LT 130 MM HG: CPT | Performed by: PSYCHIATRY & NEUROLOGY

## 2025-02-19 ASSESSMENT — ENCOUNTER SYMPTOMS
VOICE CHANGE: 0
COUGH: 0
TROUBLE SWALLOWING: 1
CONSTIPATION: 1

## 2025-02-19 NOTE — PROGRESS NOTES
[facility] NEUROLOGY  [address], [address]  [place] [address]  Phone: [phone] Fax [phone]  Jackson Null MD      Patient: Eva Trejo  Provider: Jackson Null MD    CC:   Chief Complaint   Patient presents with    Follow-up     Parkinson's      Referring Provider:    History of Present Illness:     Eva Trejo is a 45 y.o. RH female who presents for follow up of Parkinson's disease.     She is accompanied by her spouse.  She was last seen in January 2025 for chemodenervation for her cervical dystonia.     Patient presents for follow-up of Parkinson's disease, supported by an abnormal DaTscan. Symptom onset in approximately early 2022.  She is also followed at [facility] and these notes have been reviewed.     Current medications include (but not limited to):  Sinemet  mg 1 tablet three times a day (9:30pm, 2:30pm, 7:30pm)  Sinemet CR  mg 1 tablet at night (10 pm)  Artane 2 mg 1 tablet three times a day (9:30pm, 2:30pm, 7:30pm)  Baclofen 40 mg 3 times a day  Ropinirole 0.5 mg at night  Gabapentin 300 mg at night    Previous medication trials include: Rasagiline    Patient presents today for follow-up.    No significant changes since the last visit.    She recently had a telemedicine follow-up with [facility].  Records indicate a recommendation to gradually increase levodopa to a goal dose of 1.5 tablets 3 times a day.  She also remains on trihexyphenidyl 4 mg, though has noted some adverse effects including dry mouth and brain fog at the higher dose.  She remains on low-dose ropinirole at night for breakthrough RLS.    She continues to have difficulty with cervical dystonia with significant neck pain and stiffness.  Unfortunately she did not receive as much benefit with the last Botox injection.  She continues to experience sensations of spasms radiating up to the jaw and bitemporal regions.  She has known congenital central canal stenosis s/p C3-C7 ACDF in July

## 2025-03-31 ENCOUNTER — TELEPHONE (OUTPATIENT)
Dept: NEUROLOGY | Age: 45
End: 2025-03-31

## 2025-03-31 DIAGNOSIS — G24.3 CERVICAL DYSTONIA: Primary | ICD-10-CM

## 2025-03-31 DIAGNOSIS — R26.9 GAIT DISTURBANCE: ICD-10-CM

## 2025-03-31 DIAGNOSIS — G20.A2 PARKINSON'S DISEASE WITHOUT DYSKINESIA, WITH FLUCTUATING MANIFESTATIONS (HCC): Primary | ICD-10-CM

## 2025-04-23 ASSESSMENT — ENCOUNTER SYMPTOMS
TROUBLE SWALLOWING: 1
VOICE CHANGE: 0
CONSTIPATION: 1
COUGH: 0

## 2025-04-23 NOTE — PROGRESS NOTES
[facility] NEUROLOGY  [address], [address]  [place] [address]  Phone: [phone] Fax [phone]  Jackson Null MD      Patient: Eva Trejo  Provider: Jackson Null MD    CC:   Chief Complaint   Patient presents with    Procedure     Botox     Referring Provider:    History of Present Illness:     Eva Trejo is a 45 y.o. RH female who presents for follow up of Parkinson's disease and chemodenervation for cervical dystonia.     She is accompanied by her spouse.  Last injections were in January 2025.    Patient presents for follow-up of Parkinson's disease, supported by an abnormal DaTscan.  Symptom onset in approximately early 2022.  She is also followed at [facility] and these notes have been reviewed.     Current medications include (but not limited to):  Sinemet  mg 1.5 tablets three times a day (9:30pm, 2:30pm, 7:30pm)  Sinemet CR  mg 1 tablet at night (10 pm)  Artane 2 mg 1 tablet three times a day (9:30pm, 2:30pm, 7:30pm)  Baclofen 40 mg 3 times a day  Ropinirole 0.5 mg at night  Gabapentin 300 mg at night    Previous medication trials include: Rasagiline    Patient presents today for follow-up.    Chart review reveals no significant changes since the last visit.      She does continue to follow-up with [facility].  Reports having previous PD genetic testing but I have not been able to review the results.  Workup for other acquired causes of parkinsonism has been unremarkable.    Primary complaint continues to be of cervical dystonia with significant neck pain and stiffness.  Sensations of spasms radiating up to the jaw and bitemporal regions.  She has known congenital central canal stenosis s/p C3-C7 ACDF in July 2023.    This will be her third injection in the office.  Previous injections have been met with some benefit but she can experience some wearing off.  Plan for dosage increase today.  No adverse effects with prior injections.  She continues to take

## 2025-04-24 ENCOUNTER — OFFICE VISIT (OUTPATIENT)
Dept: NEUROLOGY | Age: 45
End: 2025-04-24
Payer: COMMERCIAL

## 2025-04-24 VITALS
SYSTOLIC BLOOD PRESSURE: 136 MMHG | HEART RATE: 78 BPM | HEIGHT: 68 IN | DIASTOLIC BLOOD PRESSURE: 85 MMHG | BODY MASS INDEX: 18.4 KG/M2

## 2025-04-24 DIAGNOSIS — G25.81 RLS (RESTLESS LEGS SYNDROME): ICD-10-CM

## 2025-04-24 DIAGNOSIS — R49.8 HYPOPHONIA: ICD-10-CM

## 2025-04-24 DIAGNOSIS — G20.A2 PARKINSON'S DISEASE WITHOUT DYSKINESIA, WITH FLUCTUATING MANIFESTATIONS (HCC): ICD-10-CM

## 2025-04-24 DIAGNOSIS — G24.3 CERVICAL DYSTONIA: Primary | ICD-10-CM

## 2025-04-24 DIAGNOSIS — R26.9 GAIT DISTURBANCE: ICD-10-CM

## 2025-04-24 PROCEDURE — 99213 OFFICE O/P EST LOW 20 MIN: CPT | Performed by: PSYCHIATRY & NEUROLOGY

## 2025-04-24 PROCEDURE — 64616 CHEMODENERV MUSC NECK DYSTON: CPT | Performed by: PSYCHIATRY & NEUROLOGY

## 2025-04-24 PROCEDURE — 95874 GUIDE NERV DESTR NEEDLE EMG: CPT | Performed by: PSYCHIATRY & NEUROLOGY

## 2025-04-24 PROCEDURE — 3079F DIAST BP 80-89 MM HG: CPT | Performed by: PSYCHIATRY & NEUROLOGY

## 2025-04-24 PROCEDURE — 3075F SYST BP GE 130 - 139MM HG: CPT | Performed by: PSYCHIATRY & NEUROLOGY

## 2025-04-24 RX ORDER — CARBIDOPA AND LEVODOPA 25; 100 MG/1; MG/1
1.5 TABLET ORAL 3 TIMES DAILY
Qty: 405 TABLET | Refills: 3 | Status: SHIPPED | OUTPATIENT
Start: 2025-04-24 | End: 2026-04-19

## 2025-04-24 RX ORDER — BACLOFEN 10 MG/1
40 TABLET ORAL 3 TIMES DAILY
Qty: 360 TABLET | Refills: 5 | Status: SHIPPED | OUTPATIENT
Start: 2025-04-24 | End: 2025-10-21

## 2025-05-02 ENCOUNTER — PATIENT MESSAGE (OUTPATIENT)
Dept: NEUROLOGY | Age: 45
End: 2025-05-02

## 2025-05-02 DIAGNOSIS — M62.838 MUSCLE SPASM: ICD-10-CM

## 2025-05-02 DIAGNOSIS — G24.3 CERVICAL DYSTONIA: Primary | ICD-10-CM

## 2025-05-04 RX ORDER — LORAZEPAM 1 MG/1
1 TABLET ORAL DAILY PRN
Qty: 30 TABLET | Refills: 0 | Status: SHIPPED | OUTPATIENT
Start: 2025-05-04 | End: 2025-06-03

## 2025-05-21 ENCOUNTER — OFFICE VISIT (OUTPATIENT)
Age: 45
End: 2025-05-21
Payer: COMMERCIAL

## 2025-05-21 DIAGNOSIS — G20.A1 HYPOKINETIC PARKINSONIAN DYSPHONIA (HCC): ICD-10-CM

## 2025-05-21 DIAGNOSIS — R49.0 HYPOKINETIC PARKINSONIAN DYSPHONIA (HCC): ICD-10-CM

## 2025-05-21 DIAGNOSIS — G20.A2 PARKINSON'S DISEASE WITHOUT DYSKINESIA, WITH FLUCTUATING MANIFESTATIONS (HCC): Primary | ICD-10-CM

## 2025-05-21 PROCEDURE — 92524 BEHAVRAL QUALIT ANALYS VOICE: CPT | Performed by: SPEECH-LANGUAGE PATHOLOGIST

## 2025-05-21 NOTE — PROGRESS NOTES
VOICE EVALUATION                                                                                           Initial Assessment    Eva Trejo  1980  [id number]  ICD-10: Treatment Diagnosis: R49.0 Dysphonia; Hoarseness  Precautions/Allergies:   No Known Allergies  TREATMENT PLAN:  Effective Dates: 5/21/2025 TO 8/21/2025.  Frequency/Duration: once a week for 90 Day(s) MEDICAL/REFERRING DIAGNOSIS:    ICD-10-CM    1. Parkinson's disease without dyskinesia, with fluctuating manifestations (HCC)  G20.A2       2. Hypokinetic Parkinsonian dysphonia (HCC)  G20.A1     R49.0          DATE OF ONSET: three months   REFERRING PHYSICIAN: Jackson Null MD MD Orders: evaluate and treat   Current Outpatient Medications on File Prior to Visit   Medication Sig Dispense Refill    LORazepam (ATIVAN) 1 MG tablet Take 1 tablet by mouth daily as needed for Anxiety for up to 30 days. Max Daily Amount: 1 mg 30 tablet 0    baclofen (LIORESAL) 10 MG tablet Take 4 tablets by mouth 3 times daily 360 tablet 5    carbidopa-levodopa (SINEMET)  MG per tablet Take 1.5 tablets by mouth 3 times daily 405 tablet 3    Onabotulinumtoxin A (BOTOX) 200 units injection Inject 200 Units into the muscle every 3 months 1 each 3    onabotulinumtoxinA (BOTOX) 100 units injection Inject 100 Units into the muscle every 3 months 1 each 3    carbidopa-levodopa (SINEMET CR)  MG per extended release tablet TAKE 1 TABLET BY MOUTH EVERY DAY AT NIGHT 90 tablet 3    gabapentin (NEURONTIN) 300 MG capsule Take 1 capsule by mouth nightly.      trihexyphenidyl (ARTANE) 2 MG tablet Take 1 tablet by mouth 3 times daily      rOPINIRole (REQUIP) 0.5 MG tablet Take 1 tablet by mouth nightly 90 tablet 3    onabotulinumtoxinA (BOTOX) 100 units injection Take 100 units by injection route.      hydroCHLOROthiazide (HYDRODIURIL) 25 MG tablet Take 1 tablet by mouth daily

## 2025-05-30 ENCOUNTER — OFFICE VISIT (OUTPATIENT)
Age: 45
End: 2025-05-30
Payer: COMMERCIAL

## 2025-05-30 DIAGNOSIS — G20.A1 HYPOKINETIC PARKINSONIAN DYSPHONIA (HCC): ICD-10-CM

## 2025-05-30 DIAGNOSIS — G20.A2 PARKINSON'S DISEASE WITHOUT DYSKINESIA, WITH FLUCTUATING MANIFESTATIONS (HCC): Primary | ICD-10-CM

## 2025-05-30 DIAGNOSIS — R49.0 HYPOKINETIC PARKINSONIAN DYSPHONIA (HCC): ICD-10-CM

## 2025-05-30 PROCEDURE — 92507 TX SP LANG VOICE COMM INDIV: CPT | Performed by: SPEECH-LANGUAGE PATHOLOGIST

## 2025-05-30 NOTE — PROGRESS NOTES
VOICE DAILY NOTE          Eva Trejo  1980  [id number]  ICD-10: Treatment Diagnosis: R49.0 Dysphonia; Hoarseness  Precautions/Allergies:   No Known Allergies  TREATMENT PLAN:  Effective Dates: 5/21/2025 TO 8/21/2025.  Frequency/Duration: once a week for 90 Day(s)  REFERRING PHYSICIAN: Jackson Null MD      TREATMENT DATE: 05/30/25    PROBLEM LIST (Impairments causing functional limitations):  Dysphonia   INTERVENTIONS PLANNED: (Treatment may consist of any combination of the following)    [x] Speech/Lang Therapy  71254 [] Dysphagia Therapy  87148   [] TherIvntj Cog Funcj Contact (1st 15 min)  82564 [] TherIvntj Cog Funcj Contact (addt'l 15 min)  28320         Session #:  7 Length of session: 35  minutes   1 2 3 4 5 6 7 8 9 10 Cueing Results   Produce spontaneous  conversation at 85-90 dB. 68 66 74 73 71 69 70 72 72 74 No cueing 0/10                  Warm up voice at 85-90 dB. 87 88 85 86 85 92 90 90 90 95 [x] 0      [] min  [] mod [] max 10/10                  Sustain “ah” for   10 seconds. 10 10 10 10 10 10 10 10 10 10 [x] 0      [] min  [] mod [] max 10/10   Sustain “ah” at 85-90 dB. 95 96 95 98 99 100 100 95 95 99 [x] 0      [] min  [] mod [] max 10/10                  Produce vocal glides    at 85-90 dB. (Lowest dB level) 96 92 93 92 95 92 96 93 97 96 [x] 0     [] min  [] mod [] max 10/10   Produce vocal glides with good,   clear vocal quality (+/-). + + + + + + + + + + [x] 0      [] min  [] mod [] max 10/10                  Recite numerical sequences    At 80-85 dB. 85 89 92 85 82 88 90 85 92 80 [x] 0     [] min  [] mod [] max 10/10                  Read phrases / sentences /  paragraphs  at 75-5 dB. 88 95 95 85 90 78 95 95 86 85 [x] 0      []

## 2025-06-13 ENCOUNTER — OFFICE VISIT (OUTPATIENT)
Age: 45
End: 2025-06-13

## 2025-06-13 DIAGNOSIS — R49.0 HYPOKINETIC PARKINSONIAN DYSPHONIA (HCC): ICD-10-CM

## 2025-06-13 DIAGNOSIS — G20.A1 HYPOKINETIC PARKINSONIAN DYSPHONIA (HCC): ICD-10-CM

## 2025-06-13 DIAGNOSIS — G20.A2 PARKINSON'S DISEASE WITHOUT DYSKINESIA, WITH FLUCTUATING MANIFESTATIONS (HCC): Primary | ICD-10-CM

## 2025-06-13 NOTE — PROGRESS NOTES
VOICE DAILY NOTE          Eva Trejo  1980  [id number]  ICD-10: Treatment Diagnosis: R49.0 Dysphonia; Hoarseness  Precautions/Allergies:   No Known Allergies  TREATMENT PLAN:  Effective Dates: 5/21/2025 TO 8/21/2025.  Frequency/Duration: once a week for 90 Day(s)  REFERRING PHYSICIAN: Jackson Null MD      TREATMENT DATE: 06/13/25    PROBLEM LIST (Impairments causing functional limitations):  Dysphonia   INTERVENTIONS PLANNED: (Treatment may consist of any combination of the following)    [x] Speech/Lang Therapy  93188 [] Dysphagia Therapy  39459   [] TherIvntj Cog Funcj Contact (1st 15 min)  58118 [] TherIvntj Cog Funcj Contact (addt'l 15 min)  99377         Session #: 13 Length of session: 40  minutes   1 2 3 4 5 6 7 8 9 10 Cueing Results   Produce spontaneous  conversation at 85-90 dB. 78 75 80 76 78 76 74 75 78 77 No cueing 0/10                  Warm up voice at 85-90 dB. 88 87 93 90 94 90 86 94 94 96 [x] 0      [] min  [] mod [] max 10/10                  Sustain “ah” for   10 seconds. 10 10 10 10 10 10 10 10 10 10 [x] 0      [] min  [] mod [] max 10/10   Sustain “ah” at 85-90 dB. 95 95 93 94 97 98 97 98 98 98 [x] 0      [] min  [] mod [] max 10/10                  Produce vocal glides    at 85-90 dB. (Lowest dB level) 96 95 96 94 96 98 97 97 97 97 [x] 0     [] min  [] mod [] max 10/10   Produce vocal glides with good,   clear vocal quality (+/-). + + + + + + + + + + [x] 0      [] min  [] mod [] max 10/10                  Recite numerical sequences    At 80-85 dB. 86 85 88 86 87 89 87 95 95 98 [x] 0     [] min  [] mod [] max 10/10                  Read phrases / sentences /  paragraphs  at 75-5 dB. 90 88 88 85 88 93 97 97 91 97 [x] 0      []

## 2025-08-07 ENCOUNTER — OFFICE VISIT (OUTPATIENT)
Dept: NEUROLOGY | Age: 45
End: 2025-08-07
Payer: COMMERCIAL

## 2025-08-07 VITALS
HEART RATE: 90 BPM | HEIGHT: 68 IN | SYSTOLIC BLOOD PRESSURE: 143 MMHG | BODY MASS INDEX: 18.4 KG/M2 | DIASTOLIC BLOOD PRESSURE: 83 MMHG

## 2025-08-07 DIAGNOSIS — G24.3 CERVICAL DYSTONIA: Primary | ICD-10-CM

## 2025-08-07 PROCEDURE — 3079F DIAST BP 80-89 MM HG: CPT | Performed by: PSYCHIATRY & NEUROLOGY

## 2025-08-07 PROCEDURE — 95874 GUIDE NERV DESTR NEEDLE EMG: CPT | Performed by: PSYCHIATRY & NEUROLOGY

## 2025-08-07 PROCEDURE — 3077F SYST BP >= 140 MM HG: CPT | Performed by: PSYCHIATRY & NEUROLOGY

## 2025-08-07 PROCEDURE — 99213 OFFICE O/P EST LOW 20 MIN: CPT | Performed by: PSYCHIATRY & NEUROLOGY

## 2025-08-07 PROCEDURE — 64616 CHEMODENERV MUSC NECK DYSTON: CPT | Performed by: PSYCHIATRY & NEUROLOGY

## 2025-08-07 ASSESSMENT — ENCOUNTER SYMPTOMS
COUGH: 0
TROUBLE SWALLOWING: 1
VOICE CHANGE: 0
CONSTIPATION: 1